# Patient Record
Sex: MALE | Race: WHITE | NOT HISPANIC OR LATINO
[De-identification: names, ages, dates, MRNs, and addresses within clinical notes are randomized per-mention and may not be internally consistent; named-entity substitution may affect disease eponyms.]

---

## 2020-02-24 PROBLEM — Z00.00 ENCOUNTER FOR PREVENTIVE HEALTH EXAMINATION: Status: ACTIVE | Noted: 2020-02-24

## 2020-02-25 ENCOUNTER — APPOINTMENT (OUTPATIENT)
Dept: INTERNAL MEDICINE | Facility: CLINIC | Age: 40
End: 2020-02-25
Payer: OTHER GOVERNMENT

## 2020-02-25 VITALS
HEIGHT: 70 IN | DIASTOLIC BLOOD PRESSURE: 80 MMHG | SYSTOLIC BLOOD PRESSURE: 120 MMHG | BODY MASS INDEX: 26.48 KG/M2 | WEIGHT: 185 LBS

## 2020-02-25 DIAGNOSIS — G47.00 INSOMNIA, UNSPECIFIED: ICD-10-CM

## 2020-02-25 PROCEDURE — 99204 OFFICE O/P NEW MOD 45 MIN: CPT

## 2020-02-25 NOTE — HISTORY OF PRESENT ILLNESS
[de-identified] : This is a 39-year-old male who states he is having severe sleep problems for at least several months. He gives a history of occasional mild snoring but apparently it never was much of a problem in the past. His bedtime is between 9 and 10:30. He recently was placed on Ambien 5 mg at bedtime. He may fall asleep about 11 or 12. However he usually wakes up at 2:30 in the morning and has difficulty falling back to sleep for the rest of the night. He states he occasionally tosses and turns and sleep. He often will get up out of bed and watch television or go on his computer. He usually has trouble falling back to sleep and get up at about 6 or 6:30. He feels very tired and fatigued during the day. He states he has no motivation. He often will now 30 minutes during the day. There is no witnessed apneas. He does have a history of PTSD and sees a psychologist about once or twice per month. His past medical history is unremarkable. He does have a history of psoriasis on humira every 2 weeks. He is a nonsmoker and a social drinker. 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 [FreeTextEntry1] : Patient for evaluation of sleep problems.

## 2020-02-25 NOTE — ASSESSMENT
[FreeTextEntry1] : Patient with sleep maintenance insomnia. I feel the best approach to treating this problem is with cognitive behavioral therapy. I have given patient a referral for a psychologist who deals with cognitive behavioral therapy for insomnia. Patient is also advised that he is not to watch television or go on his computer at during the night. He may read the book instead and try to go back to sleep 15-20 minutes. He is also advised to get out of bed at night if he can't sleep. He will call me in one month for followup

## 2020-02-25 NOTE — PHYSICAL EXAM
[No Acute Distress] : no acute distress [Well Developed] : well developed [Well Nourished] : well nourished [Normal Sclera/Conjunctiva] : normal sclera/conjunctiva [Well-Appearing] : well-appearing [PERRL] : pupils equal round and reactive to light [EOMI] : extraocular movements intact [Normal Outer Ear/Nose] : the outer ears and nose were normal in appearance [No JVD] : no jugular venous distention [No Lymphadenopathy] : no lymphadenopathy [Thyroid Normal, No Nodules] : the thyroid was normal and there were no nodules present [Supple] : supple [No Accessory Muscle Use] : no accessory muscle use [No Respiratory Distress] : no respiratory distress  [Regular Rhythm] : with a regular rhythm [Clear to Auscultation] : lungs were clear to auscultation bilaterally [Normal Rate] : normal rate  [No Carotid Bruits] : no carotid bruits [Normal S1, S2] : normal S1 and S2 [No Murmur] : no murmur heard [No Varicosities] : no varicosities [Pedal Pulses Present] : the pedal pulses are present [No Abdominal Bruit] : a ~M bruit was not heard ~T in the abdomen [No Edema] : there was no peripheral edema [No Palpable Aorta] : no palpable aorta [Non Tender] : non-tender [Soft] : abdomen soft [No Extremity Clubbing/Cyanosis] : no extremity clubbing/cyanosis [No HSM] : no HSM [Non-distended] : non-distended [No Masses] : no abdominal mass palpated [Normal Posterior Cervical Nodes] : no posterior cervical lymphadenopathy [Normal Anterior Cervical Nodes] : no anterior cervical lymphadenopathy [Normal Bowel Sounds] : normal bowel sounds [No Joint Swelling] : no joint swelling [No Spinal Tenderness] : no spinal tenderness [No CVA Tenderness] : no CVA  tenderness [No Rash] : no rash [Grossly Normal Strength/Tone] : grossly normal strength/tone [Coordination Grossly Intact] : coordination grossly intact [No Focal Deficits] : no focal deficits [Normal Gait] : normal gait [Normal Insight/Judgement] : insight and judgment were intact [Normal Affect] : the affect was normal [de-identified] : mallampati 2